# Patient Record
Sex: MALE | Race: BLACK OR AFRICAN AMERICAN
[De-identification: names, ages, dates, MRNs, and addresses within clinical notes are randomized per-mention and may not be internally consistent; named-entity substitution may affect disease eponyms.]

---

## 2017-01-30 ENCOUNTER — HOSPITAL ENCOUNTER (OUTPATIENT)
Dept: HOSPITAL 9 - MADLAB | Age: 62
End: 2017-01-30
Payer: COMMERCIAL

## 2017-01-30 DIAGNOSIS — I71.2: Primary | ICD-10-CM

## 2017-01-30 LAB — CREAT CL PREDICTED SERPL C-G-VRATE: 0 ML/MIN (ref 70–130)

## 2017-01-30 PROCEDURE — 82565 ASSAY OF CREATININE: CPT

## 2017-01-30 PROCEDURE — 36415 COLL VENOUS BLD VENIPUNCTURE: CPT

## 2018-03-02 ENCOUNTER — HOSPITAL ENCOUNTER (OUTPATIENT)
Dept: HOSPITAL 92 - BICCT | Age: 63
Discharge: HOME | End: 2018-03-02
Attending: INTERNAL MEDICINE
Payer: COMMERCIAL

## 2018-03-02 DIAGNOSIS — I71.2: Primary | ICD-10-CM

## 2018-03-02 PROCEDURE — 71275 CT ANGIOGRAPHY CHEST: CPT

## 2018-09-19 ENCOUNTER — HOSPITAL ENCOUNTER (OUTPATIENT)
Dept: HOSPITAL 92 - BICCT | Age: 63
Discharge: HOME | End: 2018-09-19
Attending: INTERNAL MEDICINE
Payer: COMMERCIAL

## 2018-09-19 DIAGNOSIS — E27.8: ICD-10-CM

## 2018-09-19 DIAGNOSIS — I71.2: Primary | ICD-10-CM

## 2018-09-19 LAB — ESTIMATED GFR-MDRD - POC: 82

## 2018-09-19 PROCEDURE — 82565 ASSAY OF CREATININE: CPT

## 2018-09-19 PROCEDURE — 71260 CT THORAX DX C+: CPT

## 2018-12-16 ENCOUNTER — HOSPITAL ENCOUNTER (OUTPATIENT)
Dept: HOSPITAL 92 - ERS | Age: 63
Setting detail: OBSERVATION
Discharge: HOME | End: 2018-12-16
Attending: INTERNAL MEDICINE | Admitting: INTERNAL MEDICINE
Payer: MEDICARE

## 2018-12-16 VITALS — DIASTOLIC BLOOD PRESSURE: 74 MMHG | TEMPERATURE: 98.2 F | SYSTOLIC BLOOD PRESSURE: 111 MMHG

## 2018-12-16 VITALS — BODY MASS INDEX: 36.3 KG/M2

## 2018-12-16 DIAGNOSIS — J45.909: ICD-10-CM

## 2018-12-16 DIAGNOSIS — R07.89: Primary | ICD-10-CM

## 2018-12-16 DIAGNOSIS — I10: ICD-10-CM

## 2018-12-16 DIAGNOSIS — Z79.899: ICD-10-CM

## 2018-12-16 DIAGNOSIS — Z79.82: ICD-10-CM

## 2018-12-16 DIAGNOSIS — R42: ICD-10-CM

## 2018-12-16 LAB
ALBUMIN SERPL BCG-MCNC: 4.1 G/DL (ref 3.4–4.8)
ALP SERPL-CCNC: 67 U/L (ref 40–150)
ALT SERPL W P-5'-P-CCNC: 17 U/L (ref 8–55)
ANION GAP SERPL CALC-SCNC: 13 MMOL/L (ref 10–20)
AST SERPL-CCNC: 15 U/L (ref 5–34)
BASOPHILS # BLD AUTO: 0.1 THOU/UL (ref 0–0.2)
BASOPHILS NFR BLD AUTO: 1 % (ref 0–1)
BILIRUB SERPL-MCNC: 0.6 MG/DL (ref 0.2–1.2)
BUN SERPL-MCNC: 13 MG/DL (ref 8.4–25.7)
CALCIUM SERPL-MCNC: 9.6 MG/DL (ref 7.8–10.44)
CHLORIDE SERPL-SCNC: 105 MMOL/L (ref 98–107)
CK SERPL-CCNC: 201 U/L (ref 30–200)
CO2 SERPL-SCNC: 23 MMOL/L (ref 23–31)
CREAT CL PREDICTED SERPL C-G-VRATE: 0 ML/MIN (ref 70–130)
EOSINOPHIL # BLD AUTO: 0.1 THOU/UL (ref 0–0.7)
EOSINOPHIL NFR BLD AUTO: 2.8 % (ref 0–10)
GLOBULIN SER CALC-MCNC: 3.4 G/DL (ref 2.4–3.5)
GLUCOSE SERPL-MCNC: 121 MG/DL (ref 80–115)
HGB BLD-MCNC: 14.7 G/DL (ref 14–18)
LIPASE SERPL-CCNC: 17 U/L (ref 8–78)
LYMPHOCYTES # BLD: 1.1 THOU/UL (ref 1.2–3.4)
LYMPHOCYTES NFR BLD AUTO: 22 % (ref 21–51)
MCH RBC QN AUTO: 26.9 PG (ref 27–31)
MCV RBC AUTO: 84.4 FL (ref 78–98)
MONOCYTES # BLD AUTO: 0.6 THOU/UL (ref 0.11–0.59)
MONOCYTES NFR BLD AUTO: 12.4 % (ref 0–10)
NEUTROPHILS # BLD AUTO: 3 THOU/UL (ref 1.4–6.5)
NEUTROPHILS NFR BLD AUTO: 61.8 % (ref 42–75)
PLATELET # BLD AUTO: 239 THOU/UL (ref 130–400)
POTASSIUM SERPL-SCNC: 3.9 MMOL/L (ref 3.5–5.1)
RBC # BLD AUTO: 5.45 MILL/UL (ref 4.7–6.1)
SODIUM SERPL-SCNC: 137 MMOL/L (ref 136–145)
TROPONIN I SERPL DL<=0.01 NG/ML-MCNC: (no result) NG/ML (ref ?–0.03)
TROPONIN I SERPL DL<=0.01 NG/ML-MCNC: (no result) NG/ML (ref ?–0.03)
WBC # BLD AUTO: 4.9 THOU/UL (ref 4.8–10.8)

## 2018-12-16 PROCEDURE — 36415 COLL VENOUS BLD VENIPUNCTURE: CPT

## 2018-12-16 PROCEDURE — 80053 COMPREHEN METABOLIC PANEL: CPT

## 2018-12-16 PROCEDURE — 93017 CV STRESS TEST TRACING ONLY: CPT

## 2018-12-16 PROCEDURE — 70544 MR ANGIOGRAPHY HEAD W/O DYE: CPT

## 2018-12-16 PROCEDURE — 70551 MRI BRAIN STEM W/O DYE: CPT

## 2018-12-16 PROCEDURE — 78452 HT MUSCLE IMAGE SPECT MULT: CPT

## 2018-12-16 PROCEDURE — 84484 ASSAY OF TROPONIN QUANT: CPT

## 2018-12-16 PROCEDURE — 85025 COMPLETE CBC W/AUTO DIFF WBC: CPT

## 2018-12-16 PROCEDURE — 99285 EMERGENCY DEPT VISIT HI MDM: CPT

## 2018-12-16 PROCEDURE — 82550 ASSAY OF CK (CPK): CPT

## 2018-12-16 PROCEDURE — A9500 TC99M SESTAMIBI: HCPCS

## 2018-12-16 PROCEDURE — 94760 N-INVAS EAR/PLS OXIMETRY 1: CPT

## 2018-12-16 PROCEDURE — 83690 ASSAY OF LIPASE: CPT

## 2018-12-16 PROCEDURE — 71045 X-RAY EXAM CHEST 1 VIEW: CPT

## 2018-12-16 PROCEDURE — 36416 COLLJ CAPILLARY BLOOD SPEC: CPT

## 2018-12-16 PROCEDURE — 93005 ELECTROCARDIOGRAM TRACING: CPT

## 2018-12-16 NOTE — RAD
RADIOGRAPH CHEST 1 VIEW:

 

HISTORY: 

63-year-old male with chest pain. 

 

FINDINGS:

The thoracic aorta is tortuous and ectatic.  There is no evidence of air space density, pneumothorax,
 or pulmonary edema.  The lateral costophrenic angles are sharp. There is no cardiomegaly. 

 

IMPRESSION:

1.  No acute pulmonary findings.

2.  Ectasia of thoracic aorta.

 

 

maddie []  

 

POS: [facility]

## 2018-12-16 NOTE — MRI
MRA OF THE BRAIN WITHOUT CONTRAST:

 

COMPARISON: 

None.

 

HISTORY: 

Left jaw pain and dizziness.

 

TECHNIQUE: 

An MRI of the head was performed without contrast using 3D time-of-flight imaging.

 

FINDINGS: 

The bilateral intracranial internal carotid arteries are normal in caliber.  These branch into normal
-appearing anterior and middle cerebral arteries.  There is a prominent right posterior communicating
 artery.  There is evidence of focal stenosis, occlusion, or aneurysmal dilatation in the anterior ci
rculation.

 

Both vertebral arteries form a normal-appearing basilar artery.  The posterior cerebral arteries and 
cerebellar arteries are patent.  There is no evidence of focal stenosis, occlusion, or aneurysmal dil
atation in the posterior circulation.

 

IMPRESSION: 

Unremarkable MRA of the head.

 

POS: [facility]

## 2018-12-16 NOTE — NM
NUCLEAR MEDICINE CARDIAC PERFUSION EXAMINATION WITH EJECTION FRACTION:

 

HISTORY: 

Chest pain, hypertension, and history of coronary catheterization x 2.

 

TECHNIQUE: 

A stress-only nuclear medicine cardiac perfusion examination was performed.  using 27 mCi of Techneti
um 99m sestamibi given at the peak of exercise on a treadmill using a Jhon protocol.  

 

FINDINGS:

The nonattenuated corrected images perfusion defects with stress.  Gated images show normal wall rayna
on with an ejection fraction of 46%. 

 

EDV is 124 mL.  LHR is 0.5.  

 

IMPRESSION: 

No perfusion defect seen with stress.

 

POS: [facility]

## 2018-12-17 NOTE — HP
CODE STATUS:  Full code.



CHIEF COMPLAINT:  Left-sided chest pain.



HISTORY OF PRESENT ILLNESS AND REVIEW OF SYSTEMS:  This is a 63-year-old man with a

background history of hypertension and asthma, who presents complaining of

left-sided chest pain that began approximately one week ago and has been

intermittent since then. He has also been experiencing tingling in the left forearm

and left side of his face. He woke up early hours this morning around 4:00 a.m. with

recurrent pain in his in the center of his chest radiating to the left jaw and into

his left shoulder. He once again felt tingling in his hand that has fully resolved.

He denies any associated weakness in the left arm or anywhere else in his body. He

describes a spinning sensation when he woke up with the pain and states it resolved

within seconds. He did not experience any associated nausea or vomiting. No vision

changes or speech disturbances. The pain in his chest and left shoulder has been

mild and intermittent since. He describes a sharp shooting pain behind this left eye

that remains intermittent. Denies any headaches. Has not had any recent falls or

trauma. No neck or back pain. __________ with regard to his appetite. Denies any

bowel changes or urinary symptoms. All other review of systems are negative. Of

note, he is under Dr. Zavaleta for what he describes as an arrhythmia. However, on

documentation, it seems he has an enlarged heart. He is scheduled to see Dr. Zavaleta on Thursday. He is scheduled to see his primary care doctor tomorrow. 



PAST MEDICAL HISTORY:  

1. Hypertension.

2. Asthma.



PAST SURGICAL HISTORY:  

1. Bladder suspension.

2. Benign breast tumor removed.

3. Surgery to the left knee with no residual problems.

4. Cardiac catheterization x2 without stents.



SOCIAL HISTORY:  He denies any alcohol use, drug use. Denies smoking.



ALLERGIES:  NO KNOWN DRUG ALLERGIES.



MEDICATIONS:  

1. K-Dur 40 mEq daily p.o. daily.

2. Advair Diskus 250/50 one puff inhaled daily.

3. Cholecalciferol 1000 units p.o. daily.

4. Azilsartan Med/Chlorthalidone one tablet p.o. daily.

5. Aspirin 325 mg p.o. daily.



PHYSICAL EXAMINATION:

VITAL SIGNS: Temperature 98.7, pulse 84, respirations 18, blood pressure 126/83, O2

saturation 95% on room air. 

GENERAL: The patient appears to be resting comfortably and is in no acute distress. 

HEENT: Normocephalic, atraumatic. Pupils are equal, round, and reactive to light.

Sclerae are anicteric. Oropharynx is clear. 

NECK: Supple without lymphadenopathy. 

LUNGS: Clear to auscultation. 

CARDIAC: Regular rate and rhythm without audible murmurs, rubs, or gallops. No

reproducible chest pain. 

ABDOMEN: Obese, soft, nontender, nondistended. Normoactive bowel sounds present. No

renal angle tenderness. 

EXTREMITIES: No clubbing, cyanosis, or edema. No calf tenderness. 

MUSCULOSKELETAL: No spinal tenderness. Full range of motion in neck. Full range of

motion in all extremities with strength as well as sensation intact. 

NEUROLOGIC: No focal deficits. No cerebellar signs. No ataxia. Normal facial

sensation and facial movements. Power and sensation 5/5 in all limbs. Cranial nerves

seem to be intact. 



LABORATORY DATA:  White blood count 4.9, hemoglobin 14.7, hematocrit 46, platelets

239. Sodium 137, potassium 3.9, BUN 13, creatinine 1.03, eGFR 88, glucose 121,

calcium 9.6, total bilirubin 0.6, ALT 17, alkaline phosphatase 67. __________. TnI

negative x2. Third TnI pending. Protein 7.5, albumin 4.1. Lipase 17. 



IMAGING DATA:  

1. Chest x-ray obtained, 12, 16, 18. No acute pulmonary findings. Ectasia of

thoracic aorta noted. 

2. ECG showed nonspecific ST changes. Junctional rhythm with 84 beats per minute and

no ectopics. Minimal voltage criteria for LVH present. Possibly normal variant. 



ASSESSMENT AND PLAN:  The patient will be admitted for further investigations.

1. Chest pain, rule out. He states he is under Dr. Zavaleta and recently underwent

an echo as well as carotid ultrasound on December 5, 2018. We will obtain outside

records and request a stress test. 

2. Vertigo. Transient episode lasting seconds. Following further discussion with Dr. Au, he agrees we should assess further. We will order MRI/MRA to evaluate for

possibility of cerebellar stroke. 

3. Hypertension. Monitor blood pressure and continue medications.

4. Diet. N.p.o. until stress test is complete. Then, to resume heart healthy diet.

5. Gastrointestinal prophylaxis.

6. Deep vein thrombosis prophylaxis with mechanical sequential compression devices.







Job ID:  [id number]

## 2019-02-22 ENCOUNTER — HOSPITAL ENCOUNTER (OUTPATIENT)
Dept: HOSPITAL 92 - BICCT | Age: 64
Discharge: HOME | End: 2019-02-22
Attending: INTERNAL MEDICINE
Payer: MEDICARE

## 2019-02-22 DIAGNOSIS — I77.810: ICD-10-CM

## 2019-02-22 DIAGNOSIS — I71.9: Primary | ICD-10-CM

## 2019-02-22 DIAGNOSIS — D35.02: ICD-10-CM

## 2019-02-22 LAB — ESTIMATED GFR-MDRD - POC: (no result)

## 2019-02-22 PROCEDURE — 82565 ASSAY OF CREATININE: CPT

## 2019-02-22 PROCEDURE — 71275 CT ANGIOGRAPHY CHEST: CPT

## 2019-02-22 NOTE — CT
CTA OF THE CHEST WITH CONTRAST:

 

Date:  02/22/19

 

COMPARISON:  

03/02/18. 

 

HISTORY:  

Aneurysm of the ascending aorta. 

 

TECHNIQUE:  

Multiple contiguous axial images were obtained in a CTA of the chest with contrast. 3D sagittal and c
oronal MIP reformats were performed. 

 

FINDINGS:

No focal infiltrates or masses are seen in the lungs. No pneumothorax or pleural effusions seen. 

 

The heart is normal in size without focal cardiac abnormality. No hilar or mediastinal lymphadenopath
y are seen. 

 

There is ectasia of the ascending aorta. The aortic root measures 4.6 cm in greatest dimension. The a
scending aorta distal to the sinuses of Valsalva measures 3.6 cm in greatest dimension. The descendin
g aorta measures 3.2 cm in greatest dimension. There is no evidence of dissection at this time. Evalu
ation is slightly limited secondary to motion artifact from the patient's heart. 

 

There is a stable left adrenal mass measuring approximately 2.0 cm in size. The other visualized subd
iaphragmatic structures are unremarkable. Degenerative changes are seen in the spine. The chest wall 
soft tissues are unremarkable. 

 

IMPRESSION: 

1.  Stable ectasia of the ascending aorta. 

 

2.  Stable left adrenal adenoma. 

 

 

POS: [facility]

## 2020-04-24 NOTE — MRI
MRI OF THE BRAIN WITHOUT COTNRAST:

 

COMPARISON: 

None.

 

HISTORY: 

Left-sided chest pain radiating to the left jaw with dizziness.

 

TECHNIQUE: 

Multiplanar, multisequence MR images were obtained of the brain without contrast.

 

FINDINGS: 

There are a few subtle scattered foci of high FLAIR signal in the subcortical and periventricular whi
te matter, likely secondary to small-vessel ischemic disease.  No restricted diffusion is seen to sug
gest an acute infarction.  There is no evidence of hydrocephalus, intracranial hemorrhage, or extraax
ial fluid collection.

 

The expected flow voids are present.  The corpus callosum, pituitary, and craniocervical junction are
 unremarkable.

 

The calvarium and overlying soft tissues are unremarkable.  The visualized paranasal sinuses and mast
oid air cells are well aerated.  

 

IMPRESSION: 

1.  No evidence of acute intracranial abnormality.

 

2.  Small-vessel ischemic disease.

 

POS: [facility] stated

## 2020-10-17 ENCOUNTER — HOSPITAL ENCOUNTER (EMERGENCY)
Dept: HOSPITAL 92 - ERS | Age: 65
Discharge: HOME | End: 2020-10-17
Payer: MEDICARE

## 2020-10-17 DIAGNOSIS — R10.9: Primary | ICD-10-CM

## 2020-10-17 DIAGNOSIS — Z79.82: ICD-10-CM

## 2020-10-17 DIAGNOSIS — Z79.51: ICD-10-CM

## 2020-10-17 DIAGNOSIS — I10: ICD-10-CM

## 2020-10-17 LAB
ALBUMIN SERPL BCG-MCNC: 4 G/DL (ref 3.4–4.8)
ALP SERPL-CCNC: 67 U/L (ref 40–110)
ALT SERPL W P-5'-P-CCNC: 18 U/L (ref 8–55)
ANION GAP SERPL CALC-SCNC: 11 MMOL/L (ref 10–20)
AST SERPL-CCNC: 16 U/L (ref 5–34)
BASOPHILS # BLD AUTO: 0 THOU/UL (ref 0–0.2)
BASOPHILS NFR BLD AUTO: 0.4 % (ref 0–1)
BILIRUB SERPL-MCNC: 0.6 MG/DL (ref 0.2–1.2)
BUN SERPL-MCNC: 12 MG/DL (ref 8.4–25.7)
CALCIUM SERPL-MCNC: 9.3 MG/DL (ref 7.8–10.44)
CHLORIDE SERPL-SCNC: 103 MMOL/L (ref 98–107)
CO2 SERPL-SCNC: 25 MMOL/L (ref 23–31)
CREAT CL PREDICTED SERPL C-G-VRATE: 0 ML/MIN (ref 70–130)
EOSINOPHIL # BLD AUTO: 0.1 THOU/UL (ref 0–0.7)
EOSINOPHIL NFR BLD AUTO: 1.2 % (ref 0–10)
GLOBULIN SER CALC-MCNC: 3.4 G/DL (ref 2.4–3.5)
GLUCOSE SERPL-MCNC: 117 MG/DL (ref 80–115)
HGB BLD-MCNC: 13.6 G/DL (ref 14–18)
LYMPHOCYTES # BLD: 0.7 THOU/UL (ref 1.2–3.4)
LYMPHOCYTES NFR BLD AUTO: 10.6 % (ref 21–51)
MCH RBC QN AUTO: 27.9 PG (ref 27–31)
MCV RBC AUTO: 83.3 FL (ref 78–98)
MONOCYTES # BLD AUTO: 0.5 THOU/UL (ref 0.11–0.59)
MONOCYTES NFR BLD AUTO: 7.3 % (ref 0–10)
NEUTROPHILS # BLD AUTO: 5.4 THOU/UL (ref 1.4–6.5)
NEUTROPHILS NFR BLD AUTO: 80.5 % (ref 42–75)
PLATELET # BLD AUTO: 284 THOU/UL (ref 130–400)
POTASSIUM SERPL-SCNC: 3.8 MMOL/L (ref 3.5–5.1)
RBC # BLD AUTO: 4.89 MILL/UL (ref 4.7–6.1)
SODIUM SERPL-SCNC: 135 MMOL/L (ref 136–145)
SP GR UR STRIP: 1.01 (ref 1–1.04)
WBC # BLD AUTO: 6.7 THOU/UL (ref 4.8–10.8)

## 2020-10-17 PROCEDURE — 80053 COMPREHEN METABOLIC PANEL: CPT

## 2020-10-17 PROCEDURE — 93005 ELECTROCARDIOGRAM TRACING: CPT

## 2020-10-17 PROCEDURE — 85025 COMPLETE CBC W/AUTO DIFF WBC: CPT

## 2020-10-17 PROCEDURE — 81003 URINALYSIS AUTO W/O SCOPE: CPT

## 2020-10-17 PROCEDURE — 74176 CT ABD & PELVIS W/O CONTRAST: CPT

## 2020-10-17 PROCEDURE — 36415 COLL VENOUS BLD VENIPUNCTURE: CPT

## 2020-10-17 NOTE — CT
CT of abdomen and pelvis:

10/17/2020



COMPARISON: CT of abdomen and pelvis 5/31/2018



HISTORY: Flank pain on the right



TECHNIQUE: Axial CT imaging at 5 mm intervals from lung bases through pubic symphysis without contras
t. Coronal reformatted imaging obtained.



FINDINGS: Lack of contrast media limits assessment of the viscera, bowel, vascular structures, and fo
r lymphadenopathy.



The imaged lung bases are unremarkable. No free intraperitoneal air or fluid noted.



Hepatic and splenic granulomata are noted.



There are a few scattered tiny hypodense lesions within the liver which are too small to characterize
 on this noncontrast enhanced examination. The gallbladder, the pancreas, and the right adrenal

gland appear unremarkable. There is a low-density 2.4 cm lesion within the left adrenal gland with Ho
unsfield units consistent with adrenal adenoma. There is a nonobstructing stone in the midpole of

the right kidney measuring 4 mm.



There is hypodensity in the parapelvic regions bilaterally, similar when compared to prior imaging, s
uggesting parapelvic cyst formation.



No discrete hydronephrosis or hydroureter is seen on either side.



Scattered atherosclerotic calcification of the infrarenal abdominal aorta and the arterial structures
 of the pelvis noted.



There is a calcification within the right hemipelvis along the course of the right ureter on axial im
age 77 measuring 4 mm. This could represent a vascular structure or a stone within the distal right

ureter. A vascular calcification is favored secondary to the lack of secondary signs of obstructive u
ropathy



There is stable enlargement of the prostate gland herniating into the bladder base.



Limited evaluation of the bowel without contrast demonstrates diverticulosis of the descending colon 
and sigmoid colon. No evidence for diverticulitis, bowel inflammatory change, or bowel obstruction.

The appendix is not discretely visualized on this examination. No right lower quadrant inflammatory c
hange noted.



Review of the osseous structures demonstrates multilevel degenerative change within the imaged spine 
with no worrisome lytic or blastic bone lesions appreciated.





IMPRESSION: Lobulated enlarged prostate gland herniating into the bladder base. No definitive evidenc
e for obstructive uropathy. Calcification along the course of the right ureter within the right

hemipelvis as detailed above.



Reported By: Rell Staples 

Electronically Signed:  10/17/2020 6:47 PM

## 2021-02-24 NOTE — CT
EXAM: CTA of the chest



HISTORY: Thoracic aortic aneurysm



COMPARISON: 2/22/2019, 3/2/2018



TECHNIQUE: Multiple contiguous axial images were obtained a CTA of the chest with contrast. Sagittal 
and coronal 3-D MIP reformats were performed.



FINDINGS:

HEART: Normal in size without focal cardiac abnormality.

AORTA: The aorta measures 4.5 cm in greatest dimension along the sinuses of Valsalva. The ascending a
wolfgang measures 4.2 cm in greatest dimension. The descending aorta measures 3.6 cm in greatest

dimension.



PULMONARY ARTERIES: Normal in caliber without filling defects to suggest pulmonary emboli.

MEDIASTINUM: No hilar or mediastinal lymphadenopathy.

LUNGS: No focal infiltrates or masses.

PLEURAL SPACE: No pleural effusion or pneumothorax.





CHEST WALL SOFT TISSUES: Unremarkable

VISUALIZED OSSEOUS STRUCTURES: Degenerative changes in the spine.

VISUALIZED SUBDIAPHRAGMATIC STRUCTURES: There is a stable 2.3 cm left adrenal mass consistent with an
 adrenal adenoma. There are stable hypodensities in the right lower liver. Calcified granulomas in

the spleen.



IMPRESSION:



1. Grossly stable ectasia of the thoracic aorta. The slight differences in measurement may be seconda
ry to slight heart motion during the examination.

2. Stable left adrenal adenoma



Reported By: Dimitry Galvin 

Electronically Signed:  2/24/2021 11:39 AM